# Patient Record
Sex: FEMALE | Employment: FULL TIME
[De-identification: names, ages, dates, MRNs, and addresses within clinical notes are randomized per-mention and may not be internally consistent; named-entity substitution may affect disease eponyms.]

---

## 2023-12-20 ENCOUNTER — TRANSCRIBE ORDERS (OUTPATIENT)
Dept: OTHER | Age: 38
End: 2023-12-20

## 2023-12-20 DIAGNOSIS — H46.9 OPTIC NEURITIS: Primary | ICD-10-CM

## 2023-12-29 NOTE — TELEPHONE ENCOUNTER
Action Malorie Brenda on 12/29/2023 at 2:24 PM    Action Taken Attempted to call Pt to get verbal authorization for  recs and ask if any prior imaging. No answer, left VM. -LANG    Malorie Gutierrez on 1/2/2024 at 10:47 AM Pt returned call and stated that they had an MRI about 18 mo ago and several MRI's & a CT in November. Pt gave verbal authorization to obtain outside records. -LANG     Action 1/2/24 MV 11.42am   Action Taken Images resolved in PACS       RECORDS RECEIVED FROM: External    REASON FOR VISIT: Optic neuritis   Date of Appt: 1/17/24 @ 3:30 pm    NOTES (FOR ALL VISITS) STATUS DETAILS   OFFICE NOTE from referring provider Care Everywhere Dr Martha Henriquez @ [facility]:  11/29/23   DISCHARGE SUMMARY from hospital Care Everywhere [facility]:  11/15/23-11/17/23   DISCHARGE REPORT from the ER Care Everywhere [facility]:  11/7/23   MEDICATION LIST Care Everywhere    IMAGING  (FOR ALL VISITS)     LUMBAR PUNCTURE Care Everywhere [facility];  11/16/23   MRI (HEAD, NECK, SPINE) PACS [facility]:  MRI Thoracic Spine 11/16/23  MRI Cervical Spine 11/16/23  MRI Head 11/15/23  MRA Head 11/15/23  MRI Orbit 11/15/23    [facility]:  MRI Orbit 11/7/23   CT (HEAD, NECK, SPINE) PACS [facility]:  CT Head 11/15/23

## 2024-01-17 ENCOUNTER — PRE VISIT (OUTPATIENT)
Dept: NEUROLOGY | Facility: CLINIC | Age: 39
End: 2024-01-17
Payer: COMMERCIAL

## 2024-01-17 ENCOUNTER — OFFICE VISIT (OUTPATIENT)
Dept: NEUROLOGY | Facility: CLINIC | Age: 39
End: 2024-01-17
Attending: FAMILY MEDICINE
Payer: COMMERCIAL

## 2024-01-17 VITALS
OXYGEN SATURATION: 99 % | DIASTOLIC BLOOD PRESSURE: 78 MMHG | HEIGHT: 68 IN | SYSTOLIC BLOOD PRESSURE: 121 MMHG | BODY MASS INDEX: 21.25 KG/M2 | HEART RATE: 56 BPM | WEIGHT: 140.2 LBS

## 2024-01-17 DIAGNOSIS — H46.9 OPTIC NEURITIS: Primary | ICD-10-CM

## 2024-01-17 PROCEDURE — 99214 OFFICE O/P EST MOD 30 MIN: CPT | Performed by: PSYCHIATRY & NEUROLOGY

## 2024-01-17 PROCEDURE — 99205 OFFICE O/P NEW HI 60 MIN: CPT | Performed by: PSYCHIATRY & NEUROLOGY

## 2024-01-17 RX ORDER — MAGNESIUM CARB/ALUMINUM HYDROX 105-160MG
296 TABLET,CHEWABLE ORAL ONCE
COMMUNITY

## 2024-01-17 RX ORDER — ESCITALOPRAM OXALATE 5 MG/1
5 TABLET ORAL DAILY
COMMUNITY
Start: 2023-03-23 | End: 2024-03-22

## 2024-01-17 ASSESSMENT — PAIN SCALES - GENERAL: PAINLEVEL: NO PAIN (0)

## 2024-01-17 NOTE — PROGRESS NOTES
Date of Service: 1/17/2024    [facility] Neurology   MS Clinic Evaluation    Subjective: 38-year-old woman with a history of celiac disease, strabismus, anxiety/depression, who presents for evaluation of optic neuritis.    In early November she had blurring in vision of the right eye.  Symptoms were associated with some headache and retro-orbital eye pain.  Presented to the ER, but MRI was negative and it was presumed to be migraine.  She then followed up with ophthalmology.  Examination was unremarkable.  Again thought to be migraine.  However vision subsequently declined declined over the course of the next week.  She developed a dense central scotoma.  She presented again to the ER where MRI was remarkable for right optic nerve gadolinium enhancement.  She was given IV methylprednisolone.  She did notice some improvement in her vision while she was on steroids, but feels that she has reached a plateau.  A recent eye exam was remarkable for visual acuity of 20 out of 50 or 20 out of 70.  When she received steroid she did struggle with side effects including difficulty sleeping, tachycardia, and fatigue as well as weakness.  Notes indicate that she did have steroid-induced hyperglycemia.    She denies any other past history of events suggestive of optic neuritis, brainstem syndrome or myelitis.    There is a positive family history remarkable for a sister with rheumatoid arthritis and her dad with CVID.  There is no family history of multiple sclerosis.    Allergies   Allergen Reactions    Aspirin Anaphylaxis, Angioedema, Other (See Comments) and Rash     Advised to not take aspirin    Nsaids Anaphylaxis and Angioedema    Wasp Venom Swelling and Difficulty breathing    Gluten Meal GI Disturbance, Nausea and Vomiting and Other (See Comments)     Celiac disease     Has Celiac disease   Celiac disease       Current Outpatient Medications   Medication    cholecalciferol (VITAMIN D3) 125 mcg (5000 units) capsule     "escitalopram (LEXAPRO) 5 MG tablet    magnesium citrate 1.745 GM/30ML solution     No current facility-administered medications for this visit.        Past medical, surgical, social and family history was personally reviewed. Pertinent details noted above.     Physical Examination:   /78 (BP Location: Left arm, Patient Position: Sitting, Cuff Size: Adult Regular)   Pulse 56   Ht 1.715 m (5' 7.5\")   Wt 63.6 kg (140 lb 3.2 oz)   SpO2 99%   BMI 21.63 kg/m      General: no acute distress  Cranial nerves:   VFFC  PERRL w/+R RAPD ishihara 8/8 ou but diff with right eye  EOM full w/no CHARLIE   Face symmetric  Hearing intact  No dysarthria   Motor:   Tone is normal   Bulk is normal     R L  Deltoid  5 5  Biceps  5 5  Triceps 5 5  Wrist ext 5 5  Finger ext 5 5  Finger abd 5 5    Hip flexion 5 5  Knee flexion 5 5  Knee ext 5 5  Ankle d/f 5 5    Reflexes: 2+ and symmetric throughout, babinski absent bilaterally  Sensory: vibration is normal in the toes, JPS normal in the toes   Romberg is absent  Coordination: no ataxia or dysmetria  Gait: normal base and stride, tandem gait is intact, able to balance on one foot and hop x 5 bilaterally, able to get up from chair with single leg    Tests/Imaging:   CSF 0 ocb    Vitamin D 58  JCV Ab n/d  Mog aqp4 neg    MRI Brain  11/2023 - R ON gd+, left frontal t2 hyperintensity adjascent to cortex but not ovoid, small non specific distal wm lesion in left frontal lobe    MRI Cervical spine   2023 - no lesions     MRI Thoracic spine   2023 - no lesions per personal review    Assessment: 38-year-old woman who had an event of right optic neuritis.  Severity of vision loss was rather dense.  She has had partial improvement in symptoms.    We discussed how most commonly an event of optic neuritis is an initial manifestation of multiple sclerosis, though she does not have any other diagnostic lesions in the brain that would support this diagnosis and CSF was negative for oligoclonal bands. "  Based on 1 study, there is only a 6% chance that over the course of the next 5 years she will go on to develop multiple sclerosis.    Other possible causes of the optic neuritis include MOG autoimmunity, aqua porn for autoimmunity, or sarcoidosis.  In the intensity of the gadolinium enhancement and the persistent visual deficit I recommended that she undergo a repeat MRI of the orbits to assess for persistent enhancement of the right optic nerve.  If this is negative for persistent enhancement, then radiologic surveillance for additional lesions will be planned for 6 months later.  She was in agreement with this plan.    Reviewed the common symptoms of an MS relapse.  She was encouraged to reach out if any of the symptoms occur.    She will continue with her vitamin D supplement.  We discussed exercise and dietary recommendations for autoimmune conditions.    Plan:   -MRI orbits  - Follow-up in 6 months    Note was completed with the assistance of Dragon Fluency software which can often result in accidental word substitutions.     A total of 60 minutes on the date of service were spent in the care of this patient.   Ronda Méndez MD on 1/17/2024 at 5:20 PM

## 2024-01-17 NOTE — PATIENT INSTRUCTIONS
You had an event of optic neuritis     Your MRI does not reveal any other definite MS lesions and your spinal fluid was negative for the inflammatory proteins that we see with MS     Your vision was significantly impacted by this event  This means that I am considering other causes beyond MS (MOG, NMO, sarcoidosis)  You were tested for MOG and NMO and were negative for these conditions     Mri of the orbits to see if there is any residual inflammation   If the inflammation has resolved, plan for another mri in 6 months     Follow up with me in 6 months     Reach out before then with any concerns      Please monitor for the following symptoms:   - decreased vision from one eye, often associated with pain behind the eye  - double vision, often associated with walking difficulty  - numbness/tingling in an arm or leg that progresses over a few days  - weakness in an arm or leg    Symptoms of an MS relapse often progress over several hours or days.  People commonly experience intermittent numbness/tingling.  Only symptoms lasting more than 24 hours are concerning for a new relapse.

## 2024-01-17 NOTE — NURSING NOTE
Chief Complaint   Patient presents with    MS    New Patient     Establishing MS Care      Vitals were taken and medications were reconciled.   Adrian House, EMT  3:40 PM

## 2024-01-31 ENCOUNTER — HOSPITAL ENCOUNTER (OUTPATIENT)
Dept: MRI IMAGING | Facility: HOSPITAL | Age: 39
Discharge: HOME | End: 2024-01-31
Attending: PSYCHIATRY & NEUROLOGY | Admitting: PSYCHIATRY & NEUROLOGY
Payer: COMMERCIAL

## 2024-01-31 DIAGNOSIS — H46.9 OPTIC NEURITIS: ICD-10-CM

## 2024-01-31 PROCEDURE — 255N000002 HC RX 255 OP 636: Performed by: PSYCHIATRY & NEUROLOGY

## 2024-01-31 PROCEDURE — 70553 MRI BRAIN STEM W/O & W/DYE: CPT

## 2024-01-31 PROCEDURE — A9585 GADOBUTROL INJECTION: HCPCS | Performed by: PSYCHIATRY & NEUROLOGY

## 2024-01-31 RX ORDER — GADOBUTROL 604.72 MG/ML
0.1 INJECTION INTRAVENOUS ONCE
Status: COMPLETED | OUTPATIENT
Start: 2024-01-31 | End: 2024-01-31

## 2024-01-31 RX ADMIN — GADOBUTROL 6 ML: 604.72 INJECTION INTRAVENOUS at 10:08

## 2024-02-10 ENCOUNTER — HEALTH MAINTENANCE LETTER (OUTPATIENT)
Age: 39
End: 2024-02-10

## 2024-03-28 ENCOUNTER — OFFICE VISIT (OUTPATIENT)
Dept: FAMILY MEDICINE | Facility: CLINIC | Age: 39
End: 2024-03-28
Payer: COMMERCIAL

## 2024-03-28 VITALS
OXYGEN SATURATION: 98 % | RESPIRATION RATE: 16 BRPM | WEIGHT: 135 LBS | BODY MASS INDEX: 20.83 KG/M2 | TEMPERATURE: 97.7 F | HEART RATE: 60 BPM | DIASTOLIC BLOOD PRESSURE: 78 MMHG | SYSTOLIC BLOOD PRESSURE: 118 MMHG

## 2024-03-28 DIAGNOSIS — H10.32 ACUTE CONJUNCTIVITIS OF LEFT EYE, UNSPECIFIED ACUTE CONJUNCTIVITIS TYPE: Primary | ICD-10-CM

## 2024-03-28 PROCEDURE — 99203 OFFICE O/P NEW LOW 30 MIN: CPT | Performed by: FAMILY MEDICINE

## 2024-03-28 RX ORDER — OFLOXACIN 3 MG/ML
SOLUTION/ DROPS OPHTHALMIC
Qty: 5 ML | Refills: 0 | Status: SHIPPED | OUTPATIENT
Start: 2024-03-28 | End: 2024-04-04

## 2024-03-28 NOTE — PROGRESS NOTES
Kari was seen today for eye problem and conjunctivitis.    Diagnoses and all orders for this visit:    Acute conjunctivitis of left eye, unspecified acute conjunctivitis type  -     ofloxacin (OCUFLOX) 0.3 % ophthalmic solution; 1-2 drops in affected eye(s) every 2-4 hrs while awake for 2 days and then 4 times a day for 5 days.    She has not discharge from the eye at this point so I recommend she remove her contact from left eye and use saline drops PRN for relief.  She could have had some irritant in the eye causing symptoms.  If she gets worsening symptoms or discharge, she can start the antibiotic eye drops.  Advised to throw away her current contacts and keep out for duration of treatment course.      Subjective   Kari Brown is a 38 year old is presenting for the following health issues:  Left red eye      HPI : Kari Brown noticed her left eye was red yesterday.  Has some swelling over eye and painful to move eye laterally.  No discharge from eye.  No change in vision.  She does wear contacts that she changes every 2 weeks. Still has her contact in.  Tried using some eye drops a few times but no improvement.    No trauma or injury to eye.  No new products to clean contacts or new make-up.    No rhinorrhea/ congestion or URI symptoms.  Works in ED as RN.  Last fall had optic neuritis.    Denies any risk pregnancy.      Review of Systems:     There is no problem list on file for this patient.             Objective:  /78   Pulse 60   Temp 97.7  F (36.5  C)   Resp 16   Wt 61.2 kg (135 lb)   LMP 03/11/2024   SpO2 98%   BMI 20.83 kg/m   No acute distress.  HEENT: Her tympanic membranes are grey with normal landmarks.  Eyes: PERRL.  Conjunctiva mildly injected left eye laterally.  No discharge.  Left upper lid everted with no foreign body.  Anterior chambers clear.              Rhiannon Sadler MD

## 2024-03-29 ENCOUNTER — HOSPITAL ENCOUNTER (OUTPATIENT)
Dept: MRI IMAGING | Facility: CLINIC | Age: 39
Discharge: HOME | End: 2024-03-29
Attending: PSYCHIATRY & NEUROLOGY | Admitting: PSYCHIATRY & NEUROLOGY
Payer: COMMERCIAL

## 2024-03-29 DIAGNOSIS — G37.9 DEMYELINATING DISEASE (H): ICD-10-CM

## 2024-03-29 DIAGNOSIS — H46.9 OPTIC NEURITIS: ICD-10-CM

## 2024-03-29 PROCEDURE — 255N000002 HC RX 255 OP 636: Performed by: PSYCHIATRY & NEUROLOGY

## 2024-03-29 PROCEDURE — 70553 MRI BRAIN STEM W/O & W/DYE: CPT

## 2024-03-29 PROCEDURE — A9585 GADOBUTROL INJECTION: HCPCS | Performed by: PSYCHIATRY & NEUROLOGY

## 2024-03-29 RX ORDER — GADOBUTROL 604.72 MG/ML
6 INJECTION INTRAVENOUS ONCE
Status: COMPLETED | OUTPATIENT
Start: 2024-03-29 | End: 2024-03-29

## 2024-03-29 RX ADMIN — GADOBUTROL 6 ML: 604.72 INJECTION INTRAVENOUS at 07:11

## 2024-08-22 ENCOUNTER — TELEPHONE (OUTPATIENT)
Dept: NEUROLOGY | Facility: CLINIC | Age: 39
End: 2024-08-22
Payer: COMMERCIAL

## 2024-08-22 NOTE — TELEPHONE ENCOUNTER
Patient left voicemail regarding rescheduling 9/23 Appointment with Dr. Méndez. I called Kari back and ended up rescheduling to 12/5 @ 10:30am. She also was wondering if she needed to get a MRI before or after the appointment on 12/5. She was told she had to get one every 6 months, leaving her next MRI in September

## 2024-08-22 NOTE — TELEPHONE ENCOUNTER
Called and spoke to the pt.     Informed the pt that she can get the MRI done prior to her appt with Dr. Méndez on 12/5/24.     I gave the pt the scheduling number to call to schedule for her MRI appt, [phone].      Pt have no other questions.      VALENTIN Pettit on 8/22/2024 at 3:01 PM

## 2025-01-14 ENCOUNTER — MYC MEDICAL ADVICE (OUTPATIENT)
Dept: NEUROLOGY | Facility: CLINIC | Age: 40
End: 2025-01-14

## 2025-01-14 ENCOUNTER — HOSPITAL ENCOUNTER (OUTPATIENT)
Dept: MRI IMAGING | Facility: CLINIC | Age: 40
Discharge: HOME | End: 2025-01-14
Attending: PSYCHIATRY & NEUROLOGY
Payer: COMMERCIAL

## 2025-01-14 DIAGNOSIS — G37.9 DEMYELINATING DISEASE (H): ICD-10-CM

## 2025-01-14 DIAGNOSIS — G35 MULTIPLE SCLEROSIS (H): Primary | ICD-10-CM

## 2025-01-14 DIAGNOSIS — H46.9 OPTIC NEURITIS: ICD-10-CM

## 2025-01-14 PROCEDURE — 255N000002 HC RX 255 OP 636: Performed by: PSYCHIATRY & NEUROLOGY

## 2025-01-14 PROCEDURE — A9585 GADOBUTROL INJECTION: HCPCS | Performed by: PSYCHIATRY & NEUROLOGY

## 2025-01-14 PROCEDURE — 70553 MRI BRAIN STEM W/O & W/DYE: CPT

## 2025-01-14 RX ORDER — GADOBUTROL 604.72 MG/ML
6 INJECTION INTRAVENOUS ONCE
Status: COMPLETED | OUTPATIENT
Start: 2025-01-14 | End: 2025-01-14

## 2025-01-14 RX ADMIN — GADOBUTROL 6 ML: 604.72 INJECTION INTRAVENOUS at 09:27

## 2025-01-15 PROBLEM — G35 MULTIPLE SCLEROSIS (H): Status: ACTIVE | Noted: 2025-01-15

## 2025-01-15 RX ORDER — EPINEPHRINE 1 MG/ML
0.3 INJECTION, SOLUTION, CONCENTRATE INTRAVENOUS EVERY 5 MIN PRN
OUTPATIENT
Start: 2025-01-15

## 2025-01-15 RX ORDER — HEPARIN SODIUM,PORCINE 10 UNIT/ML
5-20 VIAL (ML) INTRAVENOUS DAILY PRN
OUTPATIENT
Start: 2025-01-15

## 2025-01-15 RX ORDER — HEPARIN SODIUM (PORCINE) LOCK FLUSH IV SOLN 100 UNIT/ML 100 UNIT/ML
5 SOLUTION INTRAVENOUS
OUTPATIENT
Start: 2025-01-15

## 2025-01-15 RX ORDER — DIPHENHYDRAMINE HYDROCHLORIDE 50 MG/ML
50 INJECTION INTRAMUSCULAR; INTRAVENOUS
Start: 2025-01-15

## 2025-01-15 RX ORDER — METHYLPREDNISOLONE SODIUM SUCCINATE 40 MG/ML
40 INJECTION INTRAMUSCULAR; INTRAVENOUS
Start: 2025-01-15

## 2025-01-15 RX ORDER — DIPHENHYDRAMINE HYDROCHLORIDE 50 MG/ML
25 INJECTION INTRAMUSCULAR; INTRAVENOUS
Start: 2025-01-15

## 2025-01-15 RX ORDER — ALBUTEROL SULFATE 90 UG/1
1-2 INHALANT RESPIRATORY (INHALATION)
Start: 2025-01-15

## 2025-01-15 RX ORDER — MEPERIDINE HYDROCHLORIDE 25 MG/ML
25 INJECTION INTRAMUSCULAR; INTRAVENOUS; SUBCUTANEOUS
OUTPATIENT
Start: 2025-01-15

## 2025-01-15 RX ORDER — ALBUTEROL SULFATE 0.83 MG/ML
2.5 SOLUTION RESPIRATORY (INHALATION)
OUTPATIENT
Start: 2025-01-15

## 2025-01-22 ENCOUNTER — INFUSION THERAPY VISIT (OUTPATIENT)
Dept: INFUSION THERAPY | Facility: CLINIC | Age: 40
End: 2025-01-22
Attending: PSYCHIATRY & NEUROLOGY
Payer: COMMERCIAL

## 2025-01-22 VITALS
DIASTOLIC BLOOD PRESSURE: 63 MMHG | OXYGEN SATURATION: 98 % | HEART RATE: 58 BPM | SYSTOLIC BLOOD PRESSURE: 99 MMHG | TEMPERATURE: 97.7 F

## 2025-01-22 DIAGNOSIS — G35 MULTIPLE SCLEROSIS (H): Primary | ICD-10-CM

## 2025-01-22 PROCEDURE — 96365 THER/PROPH/DIAG IV INF INIT: CPT

## 2025-01-22 PROCEDURE — 258N000003 HC RX IP 258 OP 636: Performed by: PSYCHIATRY & NEUROLOGY

## 2025-01-22 PROCEDURE — 250N000011 HC RX IP 250 OP 636: Performed by: PSYCHIATRY & NEUROLOGY

## 2025-01-22 RX ORDER — MEPERIDINE HYDROCHLORIDE 25 MG/ML
25 INJECTION INTRAMUSCULAR; INTRAVENOUS; SUBCUTANEOUS
Status: CANCELLED | OUTPATIENT
Start: 2025-01-23

## 2025-01-22 RX ORDER — DIPHENHYDRAMINE HYDROCHLORIDE 50 MG/ML
25 INJECTION INTRAMUSCULAR; INTRAVENOUS
Status: CANCELLED
Start: 2025-01-23

## 2025-01-22 RX ORDER — DIPHENHYDRAMINE HYDROCHLORIDE 50 MG/ML
50 INJECTION INTRAMUSCULAR; INTRAVENOUS
Status: CANCELLED
Start: 2025-01-23

## 2025-01-22 RX ORDER — HEPARIN SODIUM (PORCINE) LOCK FLUSH IV SOLN 100 UNIT/ML 100 UNIT/ML
5 SOLUTION INTRAVENOUS
Status: CANCELLED | OUTPATIENT
Start: 2025-01-23

## 2025-01-22 RX ORDER — ALBUTEROL SULFATE 0.83 MG/ML
2.5 SOLUTION RESPIRATORY (INHALATION)
Status: CANCELLED | OUTPATIENT
Start: 2025-01-23

## 2025-01-22 RX ORDER — METHYLPREDNISOLONE SODIUM SUCCINATE 40 MG/ML
40 INJECTION INTRAMUSCULAR; INTRAVENOUS
Status: CANCELLED
Start: 2025-01-23

## 2025-01-22 RX ORDER — ALBUTEROL SULFATE 90 UG/1
1-2 INHALANT RESPIRATORY (INHALATION)
Status: CANCELLED
Start: 2025-01-23

## 2025-01-22 RX ORDER — EPINEPHRINE 1 MG/ML
0.3 INJECTION, SOLUTION, CONCENTRATE INTRAVENOUS EVERY 5 MIN PRN
Status: CANCELLED | OUTPATIENT
Start: 2025-01-23

## 2025-01-22 RX ORDER — HEPARIN SODIUM,PORCINE 10 UNIT/ML
5-20 VIAL (ML) INTRAVENOUS DAILY PRN
Status: CANCELLED | OUTPATIENT
Start: 2025-01-23

## 2025-01-22 RX ADMIN — SODIUM CHLORIDE 1000 MG: 9 INJECTION, SOLUTION INTRAVENOUS at 13:47

## 2025-01-22 NOTE — PROGRESS NOTES
Infusion Nursing Note:  Kari Brown presents today for methylprednisolone.    Patient seen by provider today: No   present during visit today: Not Applicable.    Note: Pt here for methylprednisolone infusion. Has received in the past.       Intravenous Access:  Peripheral IV placed.    Treatment Conditions:  Not Applicable.      Post Infusion Assessment:  Patient tolerated infusion without incident.  Site patent and intact, free from redness, edema or discomfort.  No evidence of extravasations.  Access discontinued per protocol.       Discharge Plan:   Discharge instructions reviewed with: Patient.  Patient and/or family verbalized understanding of discharge instructions and all questions answered.  Patient discharged in stable condition accompanied by: self.  Departure Mode: Ambulatory.      Keli Uriarte RN

## 2025-01-23 ENCOUNTER — INFUSION THERAPY VISIT (OUTPATIENT)
Dept: INFUSION THERAPY | Facility: CLINIC | Age: 40
End: 2025-01-23
Attending: PSYCHIATRY & NEUROLOGY
Payer: COMMERCIAL

## 2025-01-23 VITALS
HEART RATE: 56 BPM | RESPIRATION RATE: 16 BRPM | TEMPERATURE: 97.8 F | DIASTOLIC BLOOD PRESSURE: 60 MMHG | OXYGEN SATURATION: 99 % | SYSTOLIC BLOOD PRESSURE: 100 MMHG

## 2025-01-23 DIAGNOSIS — G35 MULTIPLE SCLEROSIS (H): Primary | ICD-10-CM

## 2025-01-23 PROCEDURE — 258N000003 HC RX IP 258 OP 636: Performed by: PSYCHIATRY & NEUROLOGY

## 2025-01-23 PROCEDURE — 250N000011 HC RX IP 250 OP 636: Performed by: PSYCHIATRY & NEUROLOGY

## 2025-01-23 RX ORDER — HEPARIN SODIUM,PORCINE 10 UNIT/ML
5-20 VIAL (ML) INTRAVENOUS DAILY PRN
OUTPATIENT
Start: 2025-01-24

## 2025-01-23 RX ORDER — ALBUTEROL SULFATE 90 UG/1
1-2 INHALANT RESPIRATORY (INHALATION)
Start: 2025-01-24

## 2025-01-23 RX ORDER — DIPHENHYDRAMINE HYDROCHLORIDE 50 MG/ML
25 INJECTION INTRAMUSCULAR; INTRAVENOUS
Start: 2025-01-24

## 2025-01-23 RX ORDER — DIPHENHYDRAMINE HYDROCHLORIDE 50 MG/ML
50 INJECTION INTRAMUSCULAR; INTRAVENOUS
Start: 2025-01-24

## 2025-01-23 RX ORDER — ALBUTEROL SULFATE 0.83 MG/ML
2.5 SOLUTION RESPIRATORY (INHALATION)
OUTPATIENT
Start: 2025-01-24

## 2025-01-23 RX ORDER — METHYLPREDNISOLONE SODIUM SUCCINATE 40 MG/ML
40 INJECTION INTRAMUSCULAR; INTRAVENOUS
Start: 2025-01-24

## 2025-01-23 RX ORDER — HEPARIN SODIUM (PORCINE) LOCK FLUSH IV SOLN 100 UNIT/ML 100 UNIT/ML
5 SOLUTION INTRAVENOUS
OUTPATIENT
Start: 2025-01-24

## 2025-01-23 RX ORDER — EPINEPHRINE 1 MG/ML
0.3 INJECTION, SOLUTION, CONCENTRATE INTRAVENOUS EVERY 5 MIN PRN
OUTPATIENT
Start: 2025-01-24

## 2025-01-23 RX ORDER — MEPERIDINE HYDROCHLORIDE 25 MG/ML
25 INJECTION INTRAMUSCULAR; INTRAVENOUS; SUBCUTANEOUS
OUTPATIENT
Start: 2025-01-24

## 2025-01-23 RX ADMIN — SODIUM CHLORIDE 1000 MG: 9 INJECTION, SOLUTION INTRAVENOUS at 09:41

## 2025-01-23 ASSESSMENT — PAIN SCALES - GENERAL: PAINLEVEL_OUTOF10: NO PAIN (0)

## 2025-01-23 NOTE — PROGRESS NOTES
"Infusion Nursing Note:  Kari Brown presents today for 2/3 solumedrol.    Patient seen by provider today: No   present during visit today: Not Applicable.    Note: Patient states her vision is slightly better after her first dose of solumedrol.  She states she does not feel great being on the solumedrol, has had some dizziness and shortness of breath, and just feels \"off\".      Intravenous Access:  Peripheral IV placed.    Treatment Conditions:  Not Applicable.      Post Infusion Assessment:  Patient tolerated infusion without incident.  Blood return noted pre and post infusion.  Site patent and intact, free from redness, edema or discomfort.  No evidence of extravasations.  Access discontinued per protocol.       Discharge Plan:   Patient discharged in stable condition accompanied by: SO.  Departure Mode: Ambulatory.  Will return tomorrow for final dose    Deena Butler RN    "

## 2025-02-06 ENCOUNTER — OFFICE VISIT (OUTPATIENT)
Dept: NEUROLOGY | Facility: CLINIC | Age: 40
End: 2025-02-06
Payer: COMMERCIAL

## 2025-02-06 ENCOUNTER — LAB (OUTPATIENT)
Dept: LAB | Facility: CLINIC | Age: 40
End: 2025-02-06
Payer: COMMERCIAL

## 2025-02-06 VITALS — SYSTOLIC BLOOD PRESSURE: 116 MMHG | DIASTOLIC BLOOD PRESSURE: 67 MMHG | HEART RATE: 71 BPM

## 2025-02-06 DIAGNOSIS — G35 MULTIPLE SCLEROSIS (H): ICD-10-CM

## 2025-02-06 DIAGNOSIS — G35 MULTIPLE SCLEROSIS (H): Primary | ICD-10-CM

## 2025-02-06 DIAGNOSIS — R76.8 HIGH TOTAL SERUM IGM: ICD-10-CM

## 2025-02-06 LAB
BASOPHILS # BLD AUTO: 0 10E3/UL (ref 0–0.2)
BASOPHILS NFR BLD AUTO: 1 %
EOSINOPHIL # BLD AUTO: 0.1 10E3/UL (ref 0–0.7)
EOSINOPHIL NFR BLD AUTO: 2 %
ERYTHROCYTE [DISTWIDTH] IN BLOOD BY AUTOMATED COUNT: 14.4 % (ref 10–15)
FOLATE SERPL-MCNC: 6.6 NG/ML (ref 4.6–34.8)
HBV CORE AB SERPL QL IA: NONREACTIVE
HBV SURFACE AB SERPL IA-ACNC: >1000 M[IU]/ML
HBV SURFACE AB SERPL IA-ACNC: REACTIVE M[IU]/ML
HBV SURFACE AG SERPL QL IA: NONREACTIVE
HCT VFR BLD AUTO: 37.9 % (ref 35–47)
HGB BLD-MCNC: 12.9 G/DL (ref 11.7–15.7)
IMM GRANULOCYTES # BLD: 0 10E3/UL
IMM GRANULOCYTES NFR BLD: 0 %
LYMPHOCYTES # BLD AUTO: 1.7 10E3/UL (ref 0.8–5.3)
LYMPHOCYTES NFR BLD AUTO: 36 %
MCH RBC QN AUTO: 28.9 PG (ref 26.5–33)
MCHC RBC AUTO-ENTMCNC: 34 G/DL (ref 31.5–36.5)
MCV RBC AUTO: 85 FL (ref 78–100)
MONOCYTES # BLD AUTO: 0.4 10E3/UL (ref 0–1.3)
MONOCYTES NFR BLD AUTO: 8 %
NEUTROPHILS # BLD AUTO: 2.5 10E3/UL (ref 1.6–8.3)
NEUTROPHILS NFR BLD AUTO: 53 %
NRBC # BLD AUTO: 0 10E3/UL
NRBC BLD AUTO-RTO: 0 /100
PLATELET # BLD AUTO: 179 10E3/UL (ref 150–450)
RBC # BLD AUTO: 4.47 10E6/UL (ref 3.8–5.2)
VIT B12 SERPL-MCNC: 519 PG/ML (ref 232–1245)
WBC # BLD AUTO: 4.7 10E3/UL (ref 4–11)

## 2025-02-06 PROCEDURE — 36415 COLL VENOUS BLD VENIPUNCTURE: CPT

## 2025-02-06 PROCEDURE — 86706 HEP B SURFACE ANTIBODY: CPT

## 2025-02-06 PROCEDURE — 86704 HEP B CORE ANTIBODY TOTAL: CPT

## 2025-02-06 PROCEDURE — 86787 VARICELLA-ZOSTER ANTIBODY: CPT

## 2025-02-06 PROCEDURE — 84165 PROTEIN E-PHORESIS SERUM: CPT | Mod: TC | Performed by: PATHOLOGY

## 2025-02-06 PROCEDURE — 82746 ASSAY OF FOLIC ACID SERUM: CPT

## 2025-02-06 PROCEDURE — 85004 AUTOMATED DIFF WBC COUNT: CPT

## 2025-02-06 PROCEDURE — 83921 ORGANIC ACID SINGLE QUANT: CPT

## 2025-02-06 PROCEDURE — 82607 VITAMIN B-12: CPT

## 2025-02-06 PROCEDURE — 82784 ASSAY IGA/IGD/IGG/IGM EACH: CPT

## 2025-02-06 PROCEDURE — 84155 ASSAY OF PROTEIN SERUM: CPT

## 2025-02-06 PROCEDURE — 87340 HEPATITIS B SURFACE AG IA: CPT

## 2025-02-06 NOTE — PATIENT INSTRUCTIONS
Blood work today     The recurrent event of optic neuritis is most suggestive of multiple sclerosis     Update MRI of the spinal cord     We discussed treatment - tysabri if you are molly virus negative, or ocrevus/kesimpta/briumvi    Follow up in about 4 months

## 2025-02-06 NOTE — PROGRESS NOTES
Date of Service: 2/6/2025    [facility] Neurology   MS Clinic Evaluation    Subjective: 39-year-old woman with a history of celiac disease, strabismus, anxiety/depression, who presents in follow-up for optic neuritis.    Recall that she has a history of right optic neuritis in 2023.  This past December she had a recurrent event.  He started to notice some significant blurriness in vision from the right eye again.  She experienced a decline in color vision and had difficulty reading.  Symptoms persisted for a number of weeks.  She reached out.  MRI was performed and remarkable for gadolinium enhancement of the optic nerve.  It was faints, but given the functional changes I recommended a course of IV steroids.  She tolerated the steroids, but did experience side effects including significant of fatigue and decline in activity tolerance.  Subsequent to the steroid she did notice significant improvement in her vision.    She is also experienced some numbness in the right inner thigh.  This has been ongoing for the past couple months.  She has not noticed any changes in bladder, bowel control or balance.    She currently works in the [employer].    Allergies   Allergen Reactions    Aspirin Anaphylaxis, Angioedema, Other (See Comments) and Rash     Advised to not take aspirin    Nsaids Anaphylaxis and Angioedema    Wasp Venom Swelling and Difficulty breathing    Gluten Meal GI Disturbance, Nausea and Vomiting and Other (See Comments)     Celiac disease     Has Celiac disease   Celiac disease       Current Outpatient Medications   Medication Sig Dispense Refill    escitalopram (LEXAPRO) 5 MG tablet Take 5 mg by mouth daily      cholecalciferol (VITAMIN D3) 125 mcg (5000 units) capsule Take by mouth daily      magnesium citrate 1.745 GM/30ML solution Take 296 mLs by mouth once       No current facility-administered medications for this visit.        Past medical, surgical, social and family history was personally reviewed.  Pertinent details noted above.     Physical Examination:   /67 (BP Location: Right arm, Patient Position: Sitting, Cuff Size: Adult Regular)   Pulse 71     General: no acute distress  Cranial nerves:   VFFC  PERRL w/ no RAPD  EOM full w/no CHARLIE   Face symmetric  Hearing intact  No dysarthria   Motor:   Tone is normal   Bulk is normal     R L  Deltoid  5 5  Biceps  5 5  Triceps 5 5  Wrist ext 5 5  Finger ext 5 5  Finger abd 5 5    Hip flexion 5- 5  Knee flexion 5- 5  Knee ext 5 5  Ankle d/f 5- 5    Reflexes: brisk in LE, babinski absent bilaterally  Sensory: vibration is moderately reduced in the toes, JPS mildly reduced in the right toe  Romberg is present  Coordination: no ataxia or dysmetria  Gait: normal base and stride, tandem gait is intact, able to balance on one foot and hop x 5 bilaterally, able to get up from chair with single leg but a bit more imbalanced with right leg     Tests/Imaging:   CSF 0 ocb    Vitamin D 58  JCV Ab n/d  Mog aqp4 neg    MRI Brain  11/2023 - R ON gd+, left frontal t2 hyperintensity adjascent to cortex but not ovoid, small non specific distal wm lesion in left frontal lobe  1/2025-no new lesions, R ON faintly gd+    MRI Cervical spine   2023 - no lesions     MRI Thoracic spine   2023 - no lesions per personal review    Assessment: 39-year-old woman with 2 events of optic neuritis.  Clinical examination today also reveals a decline in sensation in the lower extremities in association with Romberg.  These findings are concerning for an event of myelitis as well.  I recommended that she undergo an MRI of the cervical and thoracic spine.  It would also be reasonable for her to start treatment to prevent additional relapses.  It is my impression that most likely this is relapsing remitting multiple sclerosis.    I have therefore recommended that she consider treatment with Tysabri or an anti-B cell therapy.  Risks and benefits of each treatment were discussed in detail.  Baseline  blood work will be done today to determine which treatments are options for her.    Given her history of celiac disease I recommended including some vitamin levels.    Plan:   -MRI cervical and thoracic spine  - Baseline blood work  - Anticipate start of disease modifying therapy  - Follow-up in 4 months    Note was completed with the assistance of Dragon Fluency software which can often result in accidental word substitutions.     A total of 40 minutes on the date of service were spent in the care of this patient.   Ronda Méndez MD on 2/6/2025 at 8:35 AM

## 2025-02-06 NOTE — NURSING NOTE
Chief Complaint   Patient presents with    Follow Up       VALENTIN Pettit on 2/6/2025 at 8:29 AM

## 2025-02-06 NOTE — LETTER
2/6/2025      Kari Brown  [address]  [place] [address]      Dear Colleague,    Thank you for referring your patient, Kari Brown, to the [facility]. Please see a copy of my visit note below.    Date of Service: 2/6/2025    [facility] Neurology   MS Clinic Evaluation    Subjective: 39-year-old woman with a history of celiac disease, strabismus, anxiety/depression, who presents in follow-up for optic neuritis.    Recall that she has a history of right optic neuritis in 2023.  This past December she had a recurrent event.  He started to notice some significant blurriness in vision from the right eye again.  She experienced a decline in color vision and had difficulty reading.  Symptoms persisted for a number of weeks.  She reached out.  MRI was performed and remarkable for gadolinium enhancement of the optic nerve.  It was faints, but given the functional changes I recommended a course of IV steroids.  She tolerated the steroids, but did experience side effects including significant of fatigue and decline in activity tolerance.  Subsequent to the steroid she did notice significant improvement in her vision.    She is also experienced some numbness in the right inner thigh.  This has been ongoing for the past couple months.  She has not noticed any changes in bladder, bowel control or balance.    She currently works in the [employer].    Allergies   Allergen Reactions     Aspirin Anaphylaxis, Angioedema, Other (See Comments) and Rash     Advised to not take aspirin     Nsaids Anaphylaxis and Angioedema     Wasp Venom Swelling and Difficulty breathing     Gluten Meal GI Disturbance, Nausea and Vomiting and Other (See Comments)     Celiac disease     Has Celiac disease   Celiac disease       Current Outpatient Medications   Medication Sig Dispense Refill     escitalopram (LEXAPRO) 5 MG tablet Take 5 mg by mouth daily       cholecalciferol (VITAMIN D3) 125 mcg (5000  units) capsule Take by mouth daily       magnesium citrate 1.745 GM/30ML solution Take 296 mLs by mouth once       No current facility-administered medications for this visit.        Past medical, surgical, social and family history was personally reviewed. Pertinent details noted above.     Physical Examination:   /67 (BP Location: Right arm, Patient Position: Sitting, Cuff Size: Adult Regular)   Pulse 71     General: no acute distress  Cranial nerves:   VFFC  PERRL w/ no RAPD  EOM full w/no CHARLIE   Face symmetric  Hearing intact  No dysarthria   Motor:   Tone is normal   Bulk is normal     R L  Deltoid  5 5  Biceps  5 5  Triceps 5 5  Wrist ext 5 5  Finger ext 5 5  Finger abd 5 5    Hip flexion 5- 5  Knee flexion 5- 5  Knee ext 5 5  Ankle d/f 5- 5    Reflexes: brisk in LE, babinski absent bilaterally  Sensory: vibration is moderately reduced in the toes, JPS mildly reduced in the right toe  Romberg is present  Coordination: no ataxia or dysmetria  Gait: normal base and stride, tandem gait is intact, able to balance on one foot and hop x 5 bilaterally, able to get up from chair with single leg but a bit more imbalanced with right leg     Tests/Imaging:   CSF 0 ocb    Vitamin D 58  JCV Ab n/d  Mog aqp4 neg    MRI Brain  11/2023 - R ON gd+, left frontal t2 hyperintensity adjascent to cortex but not ovoid, small non specific distal wm lesion in left frontal lobe  1/2025-no new lesions, R ON faintly gd+    MRI Cervical spine   2023 - no lesions     MRI Thoracic spine   2023 - no lesions per personal review    Assessment: 39-year-old woman with 2 events of optic neuritis.  Clinical examination today also reveals a decline in sensation in the lower extremities in association with Romberg.  These findings are concerning for an event of myelitis as well.  I recommended that she undergo an MRI of the cervical and thoracic spine.  It would also be reasonable for her to start treatment to prevent additional relapses.  It  is my impression that most likely this is relapsing remitting multiple sclerosis.    I have therefore recommended that she consider treatment with Tysabri or an anti-B cell therapy.  Risks and benefits of each treatment were discussed in detail.  Baseline blood work will be done today to determine which treatments are options for her.    Given her history of celiac disease I recommended including some vitamin levels.    Plan:   -MRI cervical and thoracic spine  - Baseline blood work  - Anticipate start of disease modifying therapy  - Follow-up in 4 months    Note was completed with the assistance of Dragon Fluency software which can often result in accidental word substitutions.     A total of 40 minutes on the date of service were spent in the care of this patient.   Ronda Méndez MD on 2/6/2025 at 8:35 AM          Again, thank you for allowing me to participate in the care of your patient.        Sincerely,        Ronda Méndez MD    Electronically signed

## 2025-02-07 LAB
IGA SERPL-MCNC: 112 MG/DL (ref 84–499)
IGG SERPL-MCNC: 544 MG/DL (ref 610–1616)
IGM SERPL-MCNC: 327 MG/DL (ref 35–242)
VZV IGG SER QL IA: 6.43 S/CO
VZV IGG SER QL IA: POSITIVE

## 2025-02-08 ENCOUNTER — TELEPHONE (OUTPATIENT)
Dept: NEUROLOGY | Facility: CLINIC | Age: 40
End: 2025-02-08
Payer: COMMERCIAL

## 2025-02-08 DIAGNOSIS — R76.8 HIGH TOTAL SERUM IGM: Primary | ICD-10-CM

## 2025-02-08 LAB — TOTAL PROTEIN SERUM FOR ELP: 6.3 G/DL (ref 6.4–8.3)

## 2025-02-10 LAB
ALBUMIN SERPL ELPH-MCNC: 4.1 G/DL (ref 3.7–5.1)
ALPHA1 GLOB SERPL ELPH-MCNC: 0.2 G/DL (ref 0.2–0.4)
ALPHA2 GLOB SERPL ELPH-MCNC: 0.7 G/DL (ref 0.5–0.9)
B-GLOBULIN SERPL ELPH-MCNC: 0.6 G/DL (ref 0.6–1)
GAMMA GLOB SERPL ELPH-MCNC: 0.7 G/DL (ref 0.7–1.6)
M PROTEIN SERPL ELPH-MCNC: 0 G/DL
PROT PATTERN SERPL ELPH-IMP: NORMAL

## 2025-02-10 PROCEDURE — 84165 PROTEIN E-PHORESIS SERUM: CPT | Mod: 26 | Performed by: PATHOLOGY

## 2025-02-11 LAB — SCANNED LAB RESULT: ABNORMAL

## 2025-02-12 LAB — METHYLMALONATE SERPL-SCNC: 0.2 UMOL/L (ref 0–0.4)

## 2025-02-19 ENCOUNTER — HOSPITAL ENCOUNTER (OUTPATIENT)
Dept: MRI IMAGING | Facility: HOSPITAL | Age: 40
Discharge: HOME | End: 2025-02-19
Attending: PSYCHIATRY & NEUROLOGY
Payer: COMMERCIAL

## 2025-02-19 DIAGNOSIS — G35 MULTIPLE SCLEROSIS (H): ICD-10-CM

## 2025-02-19 PROCEDURE — A9585 GADOBUTROL INJECTION: HCPCS | Performed by: PSYCHIATRY & NEUROLOGY

## 2025-02-19 PROCEDURE — 72157 MRI CHEST SPINE W/O & W/DYE: CPT

## 2025-02-19 PROCEDURE — 255N000002 HC RX 255 OP 636: Performed by: PSYCHIATRY & NEUROLOGY

## 2025-02-19 PROCEDURE — 72156 MRI NECK SPINE W/O & W/DYE: CPT

## 2025-02-19 RX ORDER — GADOBUTROL 604.72 MG/ML
0.1 INJECTION INTRAVENOUS ONCE
Status: COMPLETED | OUTPATIENT
Start: 2025-02-19 | End: 2025-02-19

## 2025-02-19 RX ADMIN — GADOBUTROL 6 ML: 604.72 INJECTION INTRAVENOUS at 07:19

## 2025-02-20 ENCOUNTER — TELEPHONE (OUTPATIENT)
Dept: NEUROLOGY | Facility: CLINIC | Age: 40
End: 2025-02-20
Payer: COMMERCIAL

## 2025-02-20 DIAGNOSIS — G35 MULTIPLE SCLEROSIS (H): Primary | ICD-10-CM

## 2025-02-20 NOTE — TELEPHONE ENCOUNTER
Patient completed cervical/thoracis MRIs 2/19.     LOV 2/6 Plan:   -MRI cervical and thoracic spine  - Baseline blood work  - Anticipate start of disease modifying therapy  - Follow-up in 4 months    Denise PAEZ RN, BSN  [facility]

## 2025-02-20 NOTE — TELEPHONE ENCOUNTER
[facility] Call Center    Phone Message    May a detailed message be left on voicemail: yes     Reason for Call: Patient has questions of  what the plan is for the most MRI results.  Patient requests a call back to discuss next steps     Action Taken: [facility] Neurology    Travel Screening: Not Applicable     Date of Service:

## 2025-02-21 NOTE — TELEPHONE ENCOUNTER
[facility] Call Center    Phone Message    May a detailed message be left on voicemail: no     Reason for Call: Other: Pt returning call.     If unable to call back today (Friday) okay to try calling pt back some time on Tuesday.     Action Taken: Other: Neurology    Travel Screening: Not Applicable

## 2025-02-21 NOTE — TELEPHONE ENCOUNTER
LMTRC, will relay to patient that provider recommends that she starts medication for MS, ordered an [facility] pharmacy appt to discuss options and have them order medication in collaboration with Dr. Méndez.     Nick RIOJAS RN, BSN  [facility]

## 2025-02-24 ENCOUNTER — MYC MEDICAL ADVICE (OUTPATIENT)
Dept: NEUROLOGY | Facility: CLINIC | Age: 40
End: 2025-02-24
Payer: COMMERCIAL

## 2025-02-24 NOTE — TELEPHONE ENCOUNTER
Sent ronal colindres to patient, she is scheduled with [facility] pharmacy on 2/28    Nick RIOJAS RN, BSN  [facility]

## 2025-02-24 NOTE — TELEPHONE ENCOUNTER
Ana María HASSAN that I placed the referral to you to discuss b cell therapies for MS     However, she is inquiring about other medications. Tecfidera/dimethyl fumarate would be reasonable as a secondary option   I would also be okay with copaxone, though I would caution that this is the weakest medication and is a decker as to whether or not it will work     Ronda Méndez MD on 2/24/2025 at 5:16 PM

## 2025-03-08 ENCOUNTER — HEALTH MAINTENANCE LETTER (OUTPATIENT)
Age: 40
End: 2025-03-08

## 2025-03-10 ENCOUNTER — TELEPHONE (OUTPATIENT)
Dept: PHARMACY | Facility: CLINIC | Age: 40
End: 2025-03-10
Payer: COMMERCIAL

## 2025-03-10 DIAGNOSIS — G35 MULTIPLE SCLEROSIS (H): Primary | ICD-10-CM

## 2025-03-11 ENCOUNTER — TELEPHONE (OUTPATIENT)
Dept: ONCOLOGY | Facility: HOSPITAL | Age: 40
End: 2025-03-11
Payer: COMMERCIAL

## 2025-03-11 RX ORDER — ALBUTEROL SULFATE 90 UG/1
1-2 INHALANT RESPIRATORY (INHALATION)
Start: 2025-03-18

## 2025-03-11 RX ORDER — DIPHENHYDRAMINE HYDROCHLORIDE 50 MG/ML
25 INJECTION, SOLUTION INTRAMUSCULAR; INTRAVENOUS
Start: 2025-03-18

## 2025-03-11 RX ORDER — HEPARIN SODIUM,PORCINE 10 UNIT/ML
5-20 VIAL (ML) INTRAVENOUS DAILY PRN
OUTPATIENT
Start: 2025-03-18

## 2025-03-11 RX ORDER — METHYLPREDNISOLONE SODIUM SUCCINATE 125 MG/2ML
125 INJECTION INTRAMUSCULAR; INTRAVENOUS ONCE
OUTPATIENT
Start: 2025-03-18

## 2025-03-11 RX ORDER — ALBUTEROL SULFATE 0.83 MG/ML
2.5 SOLUTION RESPIRATORY (INHALATION)
OUTPATIENT
Start: 2025-03-18

## 2025-03-11 RX ORDER — OCRELIZUMAB 300 MG/10ML
300 INJECTION INTRAVENOUS
Status: SHIPPED
Start: 2025-03-11

## 2025-03-11 RX ORDER — HEPARIN SODIUM (PORCINE) LOCK FLUSH IV SOLN 100 UNIT/ML 100 UNIT/ML
5 SOLUTION INTRAVENOUS
OUTPATIENT
Start: 2025-03-18

## 2025-03-11 RX ORDER — DIPHENHYDRAMINE HCL 50 MG
50 CAPSULE ORAL ONCE
OUTPATIENT
Start: 2025-03-18

## 2025-03-11 RX ORDER — EPINEPHRINE 1 MG/ML
0.3 INJECTION, SOLUTION, CONCENTRATE INTRAVENOUS EVERY 5 MIN PRN
OUTPATIENT
Start: 2025-03-18

## 2025-03-11 RX ORDER — METHYLPREDNISOLONE SODIUM SUCCINATE 40 MG/ML
40 INJECTION INTRAMUSCULAR; INTRAVENOUS
Start: 2025-03-18

## 2025-03-11 RX ORDER — DIPHENHYDRAMINE HYDROCHLORIDE 50 MG/ML
50 INJECTION, SOLUTION INTRAMUSCULAR; INTRAVENOUS
Start: 2025-03-18

## 2025-03-11 RX ORDER — ACETAMINOPHEN 325 MG/1
650 TABLET ORAL ONCE
OUTPATIENT
Start: 2025-03-18

## 2025-03-11 NOTE — TELEPHONE ENCOUNTER
[employer] msg sent to the pt in regards to completing the Ocrevus form.        VALENTIN Pettit on 3/11/2025 at 1:46 PM

## 2025-03-11 NOTE — TELEPHONE ENCOUNTER
Patient returned call to discuss going forward with Ocrevus infusions. Provided education to patient on dosing, possible side effects, and infusion process. Patient will complete vaccines recommended by primary care provider and then schedule her infusion for two weeks later. Reviewed copay card information and will send patient the website to sign up for this card. Provided patient with infusion number to schedule her infusion.     Patient to start Ocrevus with plans of infusing at [facility] or [facility] depending on insurance. Ambulatory care prescription signed and therapy plan ordered.     Ana María Izaguirre, PharmD, BCACP  Medication Therapy Management Pharmacist   [facility]

## 2025-04-22 ENCOUNTER — INFUSION THERAPY VISIT (OUTPATIENT)
Dept: INFUSION THERAPY | Facility: HOSPITAL | Age: 40
End: 2025-04-22
Attending: PSYCHIATRY & NEUROLOGY
Payer: COMMERCIAL

## 2025-04-22 VITALS
HEIGHT: 68 IN | WEIGHT: 140.9 LBS | BODY MASS INDEX: 21.35 KG/M2 | DIASTOLIC BLOOD PRESSURE: 65 MMHG | TEMPERATURE: 98.6 F | OXYGEN SATURATION: 96 % | HEART RATE: 60 BPM | RESPIRATION RATE: 18 BRPM | SYSTOLIC BLOOD PRESSURE: 101 MMHG

## 2025-04-22 DIAGNOSIS — G35 MULTIPLE SCLEROSIS (H): Primary | ICD-10-CM

## 2025-04-22 PROCEDURE — 96366 THER/PROPH/DIAG IV INF ADDON: CPT

## 2025-04-22 PROCEDURE — 258N000003 HC RX IP 258 OP 636: Performed by: PSYCHIATRY & NEUROLOGY

## 2025-04-22 PROCEDURE — 96375 TX/PRO/DX INJ NEW DRUG ADDON: CPT

## 2025-04-22 PROCEDURE — 96365 THER/PROPH/DIAG IV INF INIT: CPT

## 2025-04-22 PROCEDURE — 250N000011 HC RX IP 250 OP 636: Mod: JZ | Performed by: PSYCHIATRY & NEUROLOGY

## 2025-04-22 PROCEDURE — 250N000013 HC RX MED GY IP 250 OP 250 PS 637: Performed by: PSYCHIATRY & NEUROLOGY

## 2025-04-22 RX ORDER — DIPHENHYDRAMINE HYDROCHLORIDE 50 MG/ML
25 INJECTION, SOLUTION INTRAMUSCULAR; INTRAVENOUS
Start: 2025-05-06

## 2025-04-22 RX ORDER — DIPHENHYDRAMINE HCL 50 MG
50 CAPSULE ORAL ONCE
Status: COMPLETED | OUTPATIENT
Start: 2025-04-22 | End: 2025-04-22

## 2025-04-22 RX ORDER — ALBUTEROL SULFATE 90 UG/1
1-2 INHALANT RESPIRATORY (INHALATION)
Start: 2025-05-06

## 2025-04-22 RX ORDER — DIPHENHYDRAMINE HYDROCHLORIDE 50 MG/ML
50 INJECTION, SOLUTION INTRAMUSCULAR; INTRAVENOUS
Start: 2025-05-06

## 2025-04-22 RX ORDER — METHYLPREDNISOLONE SODIUM SUCCINATE 125 MG/2ML
125 INJECTION INTRAMUSCULAR; INTRAVENOUS ONCE
Status: COMPLETED | OUTPATIENT
Start: 2025-04-22 | End: 2025-04-22

## 2025-04-22 RX ORDER — ACETAMINOPHEN 325 MG/1
650 TABLET ORAL ONCE
Status: COMPLETED | OUTPATIENT
Start: 2025-04-22 | End: 2025-04-22

## 2025-04-22 RX ORDER — EPINEPHRINE 1 MG/ML
0.3 INJECTION, SOLUTION INTRAMUSCULAR; SUBCUTANEOUS EVERY 5 MIN PRN
OUTPATIENT
Start: 2025-05-06

## 2025-04-22 RX ORDER — ACETAMINOPHEN 325 MG/1
650 TABLET ORAL ONCE
OUTPATIENT
Start: 2025-05-06

## 2025-04-22 RX ORDER — HEPARIN SODIUM (PORCINE) LOCK FLUSH IV SOLN 100 UNIT/ML 100 UNIT/ML
5 SOLUTION INTRAVENOUS
OUTPATIENT
Start: 2025-05-06

## 2025-04-22 RX ORDER — METHYLPREDNISOLONE SODIUM SUCCINATE 40 MG/ML
40 INJECTION INTRAMUSCULAR; INTRAVENOUS
Start: 2025-05-06

## 2025-04-22 RX ORDER — METHYLPREDNISOLONE SODIUM SUCCINATE 125 MG/2ML
125 INJECTION INTRAMUSCULAR; INTRAVENOUS ONCE
OUTPATIENT
Start: 2025-05-06

## 2025-04-22 RX ORDER — ALBUTEROL SULFATE 0.83 MG/ML
2.5 SOLUTION RESPIRATORY (INHALATION)
OUTPATIENT
Start: 2025-05-06

## 2025-04-22 RX ORDER — HEPARIN SODIUM,PORCINE 10 UNIT/ML
5-20 VIAL (ML) INTRAVENOUS DAILY PRN
OUTPATIENT
Start: 2025-05-06

## 2025-04-22 RX ORDER — DIPHENHYDRAMINE HCL 50 MG
50 CAPSULE ORAL ONCE
OUTPATIENT
Start: 2025-05-06

## 2025-04-22 RX ADMIN — ACETAMINOPHEN 650 MG: 325 TABLET ORAL at 08:31

## 2025-04-22 RX ADMIN — SODIUM CHLORIDE 250 ML: 0.9 INJECTION, SOLUTION INTRAVENOUS at 08:32

## 2025-04-22 RX ADMIN — OCRELIZUMAB 300 MG: 300 INJECTION INTRAVENOUS at 08:59

## 2025-04-22 RX ADMIN — DIPHENHYDRAMINE HYDROCHLORIDE 50 MG: 50 CAPSULE ORAL at 08:32

## 2025-04-22 RX ADMIN — METHYLPREDNISOLONE SODIUM SUCCINATE 125 MG: 125 INJECTION, POWDER, FOR SOLUTION INTRAMUSCULAR; INTRAVENOUS at 08:32

## 2025-04-22 NOTE — PROGRESS NOTES
Infusion Nursing Note:  Kari Brown presents today for Ocrevus first dose.    Patient seen by provider today: No   present during visit today: Not Applicable.    Note: Kari came to the clinic ambulatory and reported feeling well. She came to the infusion center for her first dose of Ocrevus for MS. Reviewed the plan with the patient and she verbalized understanding, answered her questions. Started Orevus at a rate of 30 ml/hr, pt developed a tolerable headache at 60 ml/he and flu like whole body aches at a rate of 90 ml/hr. Pt reported that all symptoms were tolerable, stopped rate increment and infused the medication at a rate of 90 ml/hr. The patient reported relieve of symptoms by the end of the infusion and was able to tolerate the medication. Pt observed for 60 min post infusion.    Premeds Given: benadryl PO, solucortef, and tylenol    Intravenous Access:  Peripheral IV placed.    Treatment Conditions:  Biological Infusion Checklist:  ~~~ NOTE: If the patient answers yes to any of the questions below, hold the infusion and contact ordering provider or on-call provider.    Have you recently had an elevated temperature, fever, chills, productive cough, coughing for 3 weeks or longer or hemoptysis,  abnormal vital signs, night sweats,  chest pain or have you noticed a decrease in your appetite, unexplained weight loss or fatigue? No  Do you have any open wounds or new incisions? No  Do you have any upcoming hospitalizations or surgeries? Does not include esophagogastroduodenoscopy, colonoscopy, endoscopic retrograde cholangiopancreatography (ERCP), endoscopic ultrasound (EUS), dental procedures or joint aspiration/steroid injections No  Do you currently have any signs of illness or infection or are you on any antibiotics? No  Have you had any new, sudden or worsening abdominal pain? No  Have you or anyone in your household received a live vaccination in the past 4 weeks? Please note: No live  vaccines while on biologic/chemotherapy until 6 months after the last treatment. Patient can receive the flu vaccine (shot only), pneumovax and the Covid vaccine. It is optimal for the patient to get these vaccines mid cycle, but they can be given at any time as long as it is not on the day of the infusion. No  Have you recently been diagnosed with any new nervous system diseases (ie. Multiple sclerosis, Guillain [place], seizures, neurological changes) or cancer diagnosis? Are you on any form of radiation or chemotherapy? No  Are you pregnant or breast feeding or do you have plans of pregnancy in the future? No  Have you been having any signs of worsening depression or suicidal ideations?  (benlysta only) No  Have there been any other new onset medical symptoms? No  Have you had any new blood clots? (IVIG only) No      Post Infusion Assessment:  IV site clear, no redness or infiltration noted. Aiyana Marshall RN discontinued access.  Biologic Infusion Post Education: Call the triage nurse at your clinic or seek medical attention if you have chills and/or temperature greater than or equal to 100.5, uncontrolled nausea/vomiting, diarrhea, constipation, dizziness, shortness of breath, chest pain, heart palpitations, weakness or any other new or concerning symptoms, questions or concerns.  You cannot have any live virus vaccines prior to or during treatment or up to 6 months post infusion.  If you have an upcoming surgery, medical procedure or dental procedure during treatment, this should be discussed with your ordering physician and your surgeon/dentist.  If you are having any concerning symptom, if you are unsure if you should get your next infusion or wish to speak to a provider before your next infusion, please call your care coordinator or triage nurse at your clinic to notify them so we can adequately serve you.       Discharge Plan:   Discharge instructions reviewed with: Patient.  Patient and/or family  verbalized understanding of discharge instructions and all questions answered.  Patient discharged in stable condition accompanied by: self.  Departure Mode: Ambulatory.      Aiyana Marshall RN

## 2025-05-06 ENCOUNTER — INFUSION THERAPY VISIT (OUTPATIENT)
Dept: INFUSION THERAPY | Facility: HOSPITAL | Age: 40
End: 2025-05-06
Attending: PSYCHIATRY & NEUROLOGY
Payer: COMMERCIAL

## 2025-05-06 VITALS
OXYGEN SATURATION: 99 % | HEART RATE: 70 BPM | DIASTOLIC BLOOD PRESSURE: 78 MMHG | TEMPERATURE: 98.2 F | RESPIRATION RATE: 16 BRPM | SYSTOLIC BLOOD PRESSURE: 121 MMHG

## 2025-05-06 DIAGNOSIS — G35 MULTIPLE SCLEROSIS (H): Primary | ICD-10-CM

## 2025-05-06 PROCEDURE — 96375 TX/PRO/DX INJ NEW DRUG ADDON: CPT

## 2025-05-06 PROCEDURE — 250N000013 HC RX MED GY IP 250 OP 250 PS 637: Performed by: PSYCHIATRY & NEUROLOGY

## 2025-05-06 PROCEDURE — 250N000011 HC RX IP 250 OP 636: Mod: JZ | Performed by: PSYCHIATRY & NEUROLOGY

## 2025-05-06 PROCEDURE — 96365 THER/PROPH/DIAG IV INF INIT: CPT

## 2025-05-06 PROCEDURE — 96366 THER/PROPH/DIAG IV INF ADDON: CPT

## 2025-05-06 PROCEDURE — 258N000003 HC RX IP 258 OP 636: Performed by: PSYCHIATRY & NEUROLOGY

## 2025-05-06 RX ORDER — DIPHENHYDRAMINE HYDROCHLORIDE 50 MG/ML
25 INJECTION, SOLUTION INTRAMUSCULAR; INTRAVENOUS
Start: 2025-05-06

## 2025-05-06 RX ORDER — HEPARIN SODIUM,PORCINE 10 UNIT/ML
5-20 VIAL (ML) INTRAVENOUS DAILY PRN
OUTPATIENT
Start: 2025-05-06

## 2025-05-06 RX ORDER — METHYLPREDNISOLONE SODIUM SUCCINATE 40 MG/ML
40 INJECTION INTRAMUSCULAR; INTRAVENOUS
Status: DISCONTINUED | OUTPATIENT
Start: 2025-05-06 | End: 2025-05-06 | Stop reason: HOSPADM

## 2025-05-06 RX ORDER — HEPARIN SODIUM (PORCINE) LOCK FLUSH IV SOLN 100 UNIT/ML 100 UNIT/ML
5 SOLUTION INTRAVENOUS
OUTPATIENT
Start: 2025-05-06

## 2025-05-06 RX ORDER — METHYLPREDNISOLONE SODIUM SUCCINATE 125 MG/2ML
125 INJECTION INTRAMUSCULAR; INTRAVENOUS ONCE
Status: CANCELLED | OUTPATIENT
Start: 2025-05-06

## 2025-05-06 RX ORDER — DIPHENHYDRAMINE HYDROCHLORIDE 50 MG/ML
25 INJECTION, SOLUTION INTRAMUSCULAR; INTRAVENOUS
Status: DISCONTINUED | OUTPATIENT
Start: 2025-05-06 | End: 2025-05-06 | Stop reason: HOSPADM

## 2025-05-06 RX ORDER — ALBUTEROL SULFATE 90 UG/1
1-2 INHALANT RESPIRATORY (INHALATION)
Start: 2025-05-06

## 2025-05-06 RX ORDER — HEPARIN SODIUM (PORCINE) LOCK FLUSH IV SOLN 100 UNIT/ML 100 UNIT/ML
5 SOLUTION INTRAVENOUS
Status: DISCONTINUED | OUTPATIENT
Start: 2025-05-06 | End: 2025-05-06 | Stop reason: HOSPADM

## 2025-05-06 RX ORDER — ACETAMINOPHEN 325 MG/1
650 TABLET ORAL ONCE
Status: CANCELLED | OUTPATIENT
Start: 2025-05-06

## 2025-05-06 RX ORDER — DIPHENHYDRAMINE HYDROCHLORIDE 50 MG/ML
50 INJECTION, SOLUTION INTRAMUSCULAR; INTRAVENOUS
Start: 2025-05-06

## 2025-05-06 RX ORDER — METHYLPREDNISOLONE SODIUM SUCCINATE 125 MG/2ML
125 INJECTION INTRAMUSCULAR; INTRAVENOUS ONCE
Status: COMPLETED | OUTPATIENT
Start: 2025-05-06 | End: 2025-05-06

## 2025-05-06 RX ORDER — EPINEPHRINE 1 MG/ML
0.3 INJECTION, SOLUTION INTRAMUSCULAR; SUBCUTANEOUS EVERY 5 MIN PRN
OUTPATIENT
Start: 2025-05-06

## 2025-05-06 RX ORDER — ALBUTEROL SULFATE 90 UG/1
1-2 INHALANT RESPIRATORY (INHALATION)
Status: DISCONTINUED | OUTPATIENT
Start: 2025-05-06 | End: 2025-05-06 | Stop reason: HOSPADM

## 2025-05-06 RX ORDER — ACETAMINOPHEN 325 MG/1
650 TABLET ORAL ONCE
Status: COMPLETED | OUTPATIENT
Start: 2025-05-06 | End: 2025-05-06

## 2025-05-06 RX ORDER — DIPHENHYDRAMINE HCL 50 MG
50 CAPSULE ORAL ONCE
Status: CANCELLED | OUTPATIENT
Start: 2025-05-06

## 2025-05-06 RX ORDER — ALBUTEROL SULFATE 0.83 MG/ML
2.5 SOLUTION RESPIRATORY (INHALATION)
Status: DISCONTINUED | OUTPATIENT
Start: 2025-05-06 | End: 2025-05-06 | Stop reason: HOSPADM

## 2025-05-06 RX ORDER — EPINEPHRINE 1 MG/ML
0.3 INJECTION, SOLUTION INTRAMUSCULAR; SUBCUTANEOUS EVERY 5 MIN PRN
Status: DISCONTINUED | OUTPATIENT
Start: 2025-05-06 | End: 2025-05-06 | Stop reason: HOSPADM

## 2025-05-06 RX ORDER — DIPHENHYDRAMINE HYDROCHLORIDE 50 MG/ML
50 INJECTION, SOLUTION INTRAMUSCULAR; INTRAVENOUS
Status: DISCONTINUED | OUTPATIENT
Start: 2025-05-06 | End: 2025-05-06 | Stop reason: HOSPADM

## 2025-05-06 RX ORDER — ALBUTEROL SULFATE 0.83 MG/ML
2.5 SOLUTION RESPIRATORY (INHALATION)
OUTPATIENT
Start: 2025-05-06

## 2025-05-06 RX ORDER — HEPARIN SODIUM,PORCINE 10 UNIT/ML
5-20 VIAL (ML) INTRAVENOUS DAILY PRN
Status: DISCONTINUED | OUTPATIENT
Start: 2025-05-06 | End: 2025-05-06 | Stop reason: HOSPADM

## 2025-05-06 RX ORDER — METHYLPREDNISOLONE SODIUM SUCCINATE 40 MG/ML
40 INJECTION INTRAMUSCULAR; INTRAVENOUS
Start: 2025-05-06

## 2025-05-06 RX ORDER — DIPHENHYDRAMINE HCL 50 MG
50 CAPSULE ORAL ONCE
Status: COMPLETED | OUTPATIENT
Start: 2025-05-06 | End: 2025-05-06

## 2025-05-06 RX ADMIN — ACETAMINOPHEN 650 MG: 325 TABLET ORAL at 08:47

## 2025-05-06 RX ADMIN — SODIUM CHLORIDE 250 ML: 0.9 INJECTION, SOLUTION INTRAVENOUS at 08:47

## 2025-05-06 RX ADMIN — DIPHENHYDRAMINE HYDROCHLORIDE 50 MG: 50 CAPSULE ORAL at 08:48

## 2025-05-06 RX ADMIN — OCRELIZUMAB 300 MG: 300 INJECTION INTRAVENOUS at 09:20

## 2025-05-06 RX ADMIN — METHYLPREDNISOLONE SODIUM SUCCINATE 125 MG: 125 INJECTION, POWDER, FOR SOLUTION INTRAMUSCULAR; INTRAVENOUS at 08:48

## 2025-05-06 NOTE — PROGRESS NOTES
~~~ NOTE: If the patient answers yes to any of the questions below, hold the infusion and contact ordering provider or on-call provider.    Do you currently have any signs of illness or infection or are you on any antibiotics? No  Have you recently had an elevated temperature, fever, chills, productive cough, coughing for 3 weeks or longer or hemoptysis, abnormal vital signs, night sweats, chest pain or have you noticed a decrease in your appetite, unexplained weight loss or fatigue? No  Have you had any new, sudden, or worsening abdominal pain? No  Do you have any open wounds or new incisions? (exclude for patients with hidradenitis suppurativa) No  Have you recently been diagnosed with any new nervous system diseases (ie. Multiple sclerosis, Guillain [place], seizures, neurological changes) or cancer diagnosis? Are you on any form of radiation or chemotherapy? No  Have there been any other new onset medical symptoms? No  Are you pregnant or breast feeding or do you have plans of pregnancy in the future? No; N/A  Do you have any upcoming hospitalizations or surgeries? Does not include esophagogastroduodenoscopy, colonoscopy, endoscopic retrograde cholangiopancreatography (ERCP), endoscopic ultrasound (EUS), dental procedures (including cleanings, fillings, implants, extractions)  or joint aspiration/steroid injections No  Have you or anyone in your household received a live vaccination in the past 4 weeks? Please note: No live vaccines while on biologic/chemotherapy until 6 months after the last treatment. Patient can receive the flu vaccine (shot only).  It is optimal for the patient to get it mid cycle, but it can be given at any time as long as it is not on the day of the infusion. No  If applicable to prescribed medication, confirm negative PPD or quantiferon gold MTB. If positive, verify has negative chest x-ray or the patient is at least 4 weeks post initiation of INH/B6 therapy and have clearance from provider  before infusion (Y/N:[id number])  If applicable to prescribed medication, confirm negative hepatitis B surface antigen or hepatitis C. If positive, clearance from provider before infusion. (Y/N: [id number])  Rheumatology patients receiving tocilizumab (Actemra): If labs were drawn within the past week, hold dosing until cleared to infuse If AST/ALT > 2 X upper limit normal; ANC < 1.0. NO; N/A  Patients receiving belimumab (Benlysta): Have you been having any signs of worsening depression or suicidal ideations? No; N/A

## 2025-05-06 NOTE — PROGRESS NOTES
Infusion Nursing Note:  Kari Brown presents today for IV ocrevus.    Patient seen by provider today: No   present during visit today: Not Applicable.    Note: Tolerated infusion well today, she did complain of a slight itchyness but refused any further medications.  She stated it would pass and she did not need further interventions.  Symptoms of itching lasted about 30 minutes then subsided.  Her rate of infusion reached 150 ml/hr. When she finished.  She was observed for one hour post infusion with no further complaints.  Discharge to home ambulating per self..    Premeds Given: benadryl PO, solumedrol, and tylenol    Intravenous Access:  Peripheral IV placed.    Treatment Conditions:  Not Applicable.      Post Infusion Assessment:  Patient tolerated infusion without incident.  Blood return noted pre and post infusion.  No evidence of extravasations.  Access discontinued per protocol.       Discharge Plan:   Patient and/or family verbalized understanding of discharge instructions and all questions answered.      Pamela Finch RN

## 2025-06-23 ENCOUNTER — OFFICE VISIT (OUTPATIENT)
Dept: NEUROLOGY | Facility: CLINIC | Age: 40
End: 2025-06-23
Payer: COMMERCIAL

## 2025-06-23 ENCOUNTER — LAB (OUTPATIENT)
Dept: LAB | Facility: CLINIC | Age: 40
End: 2025-06-23
Payer: COMMERCIAL

## 2025-06-23 VITALS — SYSTOLIC BLOOD PRESSURE: 115 MMHG | DIASTOLIC BLOOD PRESSURE: 73 MMHG | HEART RATE: 55 BPM

## 2025-06-23 DIAGNOSIS — R76.8 HIGH TOTAL SERUM IGM: ICD-10-CM

## 2025-06-23 DIAGNOSIS — G35 MULTIPLE SCLEROSIS (H): Primary | ICD-10-CM

## 2025-06-23 DIAGNOSIS — G35 MULTIPLE SCLEROSIS (H): ICD-10-CM

## 2025-06-23 LAB
BASOPHILS # BLD AUTO: 0.1 10E3/UL (ref 0–0.2)
BASOPHILS NFR BLD AUTO: 1 %
CD19 B CELL COMMENT: ABNORMAL
CD19 CELLS # BLD: <1 CELLS/UL (ref 107–698)
CD19 CELLS NFR BLD: <1 % (ref 6–27)
EOSINOPHIL # BLD AUTO: 0.1 10E3/UL (ref 0–0.7)
EOSINOPHIL NFR BLD AUTO: 3 %
ERYTHROCYTE [DISTWIDTH] IN BLOOD BY AUTOMATED COUNT: 14.1 % (ref 10–15)
HCT VFR BLD AUTO: 41.5 % (ref 35–47)
HGB BLD-MCNC: 13.9 G/DL (ref 11.7–15.7)
IMM GRANULOCYTES # BLD: 0.1 10E3/UL
IMM GRANULOCYTES NFR BLD: 1 %
LYMPHOCYTES # BLD AUTO: 1.3 10E3/UL (ref 0.8–5.3)
LYMPHOCYTES NFR BLD AUTO: 25 %
MCH RBC QN AUTO: 29.4 PG (ref 26.5–33)
MCHC RBC AUTO-ENTMCNC: 33.5 G/DL (ref 31.5–36.5)
MCV RBC AUTO: 88 FL (ref 78–100)
MONOCYTES # BLD AUTO: 0.7 10E3/UL (ref 0–1.3)
MONOCYTES NFR BLD AUTO: 13 %
NEUTROPHILS # BLD AUTO: 3 10E3/UL (ref 1.6–8.3)
NEUTROPHILS NFR BLD AUTO: 57 %
NRBC # BLD AUTO: 0 10E3/UL
NRBC BLD AUTO-RTO: 0 /100
PLATELET # BLD AUTO: 206 10E3/UL (ref 150–450)
RBC # BLD AUTO: 4.73 10E6/UL (ref 3.8–5.2)
WBC # BLD AUTO: 5.2 10E3/UL (ref 4–11)

## 2025-06-23 PROCEDURE — 3074F SYST BP LT 130 MM HG: CPT | Performed by: PSYCHIATRY & NEUROLOGY

## 2025-06-23 PROCEDURE — 82784 ASSAY IGA/IGD/IGG/IGM EACH: CPT

## 2025-06-23 PROCEDURE — 85004 AUTOMATED DIFF WBC COUNT: CPT

## 2025-06-23 PROCEDURE — 36415 COLL VENOUS BLD VENIPUNCTURE: CPT

## 2025-06-23 PROCEDURE — 86355 B CELLS TOTAL COUNT: CPT

## 2025-06-23 PROCEDURE — 99214 OFFICE O/P EST MOD 30 MIN: CPT | Performed by: PSYCHIATRY & NEUROLOGY

## 2025-06-23 PROCEDURE — G2211 COMPLEX E/M VISIT ADD ON: HCPCS | Performed by: PSYCHIATRY & NEUROLOGY

## 2025-06-23 PROCEDURE — 3078F DIAST BP <80 MM HG: CPT | Performed by: PSYCHIATRY & NEUROLOGY

## 2025-06-23 NOTE — LETTER
6/23/2025      Kari Brown  [address] [facility] [address] [facility] [place] [address]      Dear Colleague,    Thank you for referring your patient, Kari Brown, to the [facility] NEUROLOGY [facility]. Please see a copy of my visit note below.    Date of Service: 6/23/2025    [facility]rology   MS Clinic Evaluation    Subjective: 40-year-old woman with a history of celiac disease, strabismus, anxiety/depression, who presents in follow-up for multiple sclerosis.    Recall that she has had 2 events of optic neuritis and MRI did reveal a couple small lesions in the upper cervical spine.  She also experienced some mild sensory myelitis symptoms.    She is now appropriately status post ocrelizumab.  She did experience a headache and flulike symptoms with the first dose, though this was only present during the infusion.  With the second dose she had slight itching.  Subsequent to the infusion she had some burning type pain in the extremities that recovered over the course of 1 week.  She noticed exercise intolerance during this time.  She overall felt that the treatment was manageable.    She does not report any discrete new symptoms related to multiple sclerosis.  Balance is okay, though she notes that she had an incident where she fell into a wall.  She was at work, got up really fast and started walking quickly but lost her balance and fell into the wall.    Bladder and bowel control is adequate.    Vision is at baseline.  Part of the view from the right eye is missing/pixelated.    She denies any major illnesses.  She had 1 cold that was manageable.    She had numbness in the right thigh at the time of her last appointment, though this is resolved.    She shares that her father has CVID.    Disease onset: ON 2023  Last relapse: 12/2024 recurrent R ON     DMD hx:   Ocrevus 4/22/25-present, LD 5/6/25    Allergies   Allergen Reactions     Aspirin Anaphylaxis, Angioedema, Other (See Comments) and Rash     Advised  to not take aspirin     Ibuprofen Anaphylaxis and Swelling     Face and throat swelling     Face and throat swelling     Nsaids Anaphylaxis and Angioedema     Wasp Venom Swelling and Difficulty breathing     Gluten Meal GI Disturbance, Nausea and Vomiting and Other (See Comments)     Celiac disease     Has Celiac disease   Celiac disease       Current Outpatient Medications   Medication Sig Dispense Refill     albuterol (PROAIR HFA/PROVENTIL HFA/VENTOLIN HFA) 108 (90 Base) MCG/ACT inhaler Inhale 1-2 puffs into the lungs every 6 hours as needed.       escitalopram (LEXAPRO) 5 MG tablet Take 5 mg by mouth daily       levonorgestrel (MIRENA) 52 MG (20 mcg/day) IUD 1 each by Intrauterine route once.       ocrelizumab (OCREVUS) 300 MG/10ML injection Inject 10 mLs (300 mg) into the vein every 14 days. (Patient taking differently: Inject 300 mg into the vein every 6 months.)       No current facility-administered medications for this visit.        Past medical, surgical, social and family history was personally reviewed. Pertinent details noted above.     Physical Examination:   /73 (BP Location: Right arm, Patient Position: Sitting, Cuff Size: Adult Regular)   Pulse 55     General: no acute distress  Cranial nerves:   VFFC  PER  EOM full w/no CHARLIE   Face symmetric  Hearing intact  No dysarthria   Motor:   Tone is normal   Bulk is normal     R L  Deltoid  5 5  Biceps  5 5  Triceps 5 5  Wrist ext 5 5  Finger ext 5 5  Finger abd 5 5    Hip flexion 5- 5-  Knee flexion 5 5  Knee ext 5 5  Ankle d/f 5 5    Reflexes: brisk in LE, babinski absent bilaterally  Sensory: vibration is minimally reduced in the toes, JPS mildly reduced in the right toe  Romberg is absent  Coordination: no ataxia or dysmetria  Gait: normal base and stride, tandem gait is intact, able to balance on one foot and hop x 5 bilaterally, able to get up from chair with single leg but a bit more difficult with left leg     Tests/Imaging:   CSF 0  ocb    Vitamin D 58  JCV Ab 0.75  Mog aqp4 neg    IgG 544  Alc 1700    MRI Brain  11/2023 - R ON gd+, left frontal t2 hyperintensity adjascent to cortex but not ovoid, small non specific distal wm lesion in left frontal lobe  1/2025-no new lesions, R ON faintly gd+    MRI Cervical spine   2023 - no lesions   2/2025-couple small lesions in upper c spine     MRI Thoracic spine   2023 - no lesions per personal review  2/2025-no lesions     Assessment: 40-year-old woman with relapsing remitting multiple sclerosis based on clinical history of 2 events of optic neuritis and evidence of myelitis.    She is now status post ocrelizumab.  She is tolerated treatment without major side effects.  Blood work is advised today to assess for toxicity.    She is due for radiologic surveillance in 6 months, this is to assess treatment response.    She is noted to have an elevated IgM and slightly low IgG.  We will follow-up on these today.  We discussed indications for IVIG.    Plan:   - Continue Ocrevus 600 mg IV every 6 months, blood work on day of infusion: CBC with differential, CD19, IgG  - Blood work today  - MRI in 6 months  - Follow-up after MRI    Note was completed with the assistance of Dragon Fluency software which can often result in accidental word substitutions.   The longitudinal plan of care for the diagnosis(es)/condition(s) as documented were addressed during this visit. Due to the added complexity in care, I will continue to support Kari in the subsequent management and with ongoing continuity of care.    A total of 30 minutes on the date of service were spent in the care of this patient.   Ronda Méndez MD on 6/23/2025 at 9:17 AM            Again, thank you for allowing me to participate in the care of your patient.        Sincerely,        Ronda Méndez MD    Electronically signed

## 2025-06-23 NOTE — PATIENT INSTRUCTIONS
Blood work today     Continue ocrevus - due in early November   Blood work again on the day of your infusion     Mri in 6 months     Follow up after MRI

## 2025-06-23 NOTE — PROGRESS NOTES
Date of Service: 6/23/2025    [facility] Neurology   MS Clinic Evaluation    Subjective: 40-year-old woman with a history of celiac disease, strabismus, anxiety/depression, who presents in follow-up for multiple sclerosis.    Recall that she has had 2 events of optic neuritis and MRI did reveal a couple small lesions in the upper cervical spine.  She also experienced some mild sensory myelitis symptoms.    She is now appropriately status post ocrelizumab.  She did experience a headache and flulike symptoms with the first dose, though this was only present during the infusion.  With the second dose she had slight itching.  Subsequent to the infusion she had some burning type pain in the extremities that recovered over the course of 1 week.  She noticed exercise intolerance during this time.  She overall felt that the treatment was manageable.    She does not report any discrete new symptoms related to multiple sclerosis.  Balance is okay, though she notes that she had an incident where she fell into a wall.  She was at work, got up really fast and started walking quickly but lost her balance and fell into the wall.    Bladder and bowel control is adequate.    Vision is at baseline.  Part of the view from the right eye is missing/pixelated.    She denies any major illnesses.  She had 1 cold that was manageable.    She had numbness in the right thigh at the time of her last appointment, though this is resolved.    She shares that her father has CVID.    Disease onset: ON 2023  Last relapse: 12/2024 recurrent R ON     DMD hx:   Ocrevus 4/22/25-present, LD 5/6/25    Allergies   Allergen Reactions    Aspirin Anaphylaxis, Angioedema, Other (See Comments) and Rash     Advised to not take aspirin    Ibuprofen Anaphylaxis and Swelling     Face and throat swelling     Face and throat swelling    Nsaids Anaphylaxis and Angioedema    Wasp Venom Swelling and Difficulty breathing    Gluten Meal GI Disturbance, Nausea and Vomiting  and Other (See Comments)     Celiac disease     Has Celiac disease   Celiac disease       Current Outpatient Medications   Medication Sig Dispense Refill    albuterol (PROAIR HFA/PROVENTIL HFA/VENTOLIN HFA) 108 (90 Base) MCG/ACT inhaler Inhale 1-2 puffs into the lungs every 6 hours as needed.      escitalopram (LEXAPRO) 5 MG tablet Take 5 mg by mouth daily      levonorgestrel (MIRENA) 52 MG (20 mcg/day) IUD 1 each by Intrauterine route once.      ocrelizumab (OCREVUS) 300 MG/10ML injection Inject 10 mLs (300 mg) into the vein every 14 days. (Patient taking differently: Inject 300 mg into the vein every 6 months.)       No current facility-administered medications for this visit.        Past medical, surgical, social and family history was personally reviewed. Pertinent details noted above.     Physical Examination:   /73 (BP Location: Right arm, Patient Position: Sitting, Cuff Size: Adult Regular)   Pulse 55     General: no acute distress  Cranial nerves:   VFFC  PER  EOM full w/no CHARLIE   Face symmetric  Hearing intact  No dysarthria   Motor:   Tone is normal   Bulk is normal     R L  Deltoid  5 5  Biceps  5 5  Triceps 5 5  Wrist ext 5 5  Finger ext 5 5  Finger abd 5 5    Hip flexion 5- 5-  Knee flexion 5 5  Knee ext 5 5  Ankle d/f 5 5    Reflexes: brisk in LE, babinski absent bilaterally  Sensory: vibration is minimally reduced in the toes, JPS mildly reduced in the right toe  Romberg is absent  Coordination: no ataxia or dysmetria  Gait: normal base and stride, tandem gait is intact, able to balance on one foot and hop x 5 bilaterally, able to get up from chair with single leg but a bit more difficult with left leg     Tests/Imaging:   CSF 0 ocb    Vitamin D 58  JCV Ab 0.75  Mog aqp4 neg    IgG 544  Alc 1700    MRI Brain  11/2023 - R ON gd+, left frontal t2 hyperintensity adjascent to cortex but not ovoid, small non specific distal wm lesion in left frontal lobe  1/2025-no new lesions, R ON faintly  gd+    MRI Cervical spine   2023 - no lesions   2/2025-couple small lesions in upper c spine     MRI Thoracic spine   2023 - no lesions per personal review  2/2025-no lesions     Assessment: 40-year-old woman with relapsing remitting multiple sclerosis based on clinical history of 2 events of optic neuritis and evidence of myelitis.    She is now status post ocrelizumab.  She is tolerated treatment without major side effects.  Blood work is advised today to assess for toxicity.    She is due for radiologic surveillance in 6 months, this is to assess treatment response.    She is noted to have an elevated IgM and slightly low IgG.  We will follow-up on these today.  We discussed indications for IVIG.    Plan:   - Continue Ocrevus 600 mg IV every 6 months, blood work on day of infusion: CBC with differential, CD19, IgG  - Blood work today  - MRI in 6 months  - Follow-up after MRI    Note was completed with the assistance of Dragon Fluency software which can often result in accidental word substitutions.   The longitudinal plan of care for the diagnosis(es)/condition(s) as documented were addressed during this visit. Due to the added complexity in care, I will continue to support Kari in the subsequent management and with ongoing continuity of care.    A total of 30 minutes on the date of service were spent in the care of this patient.   Ronda Méndez MD on 6/23/2025 at 9:17 AM

## 2025-06-23 NOTE — NURSING NOTE
Chief Complaint   Patient presents with    Follow Up       VALENTIN Pettit on 6/23/2025 at 9:13 AM

## 2025-06-24 LAB
IGG SERPL-MCNC: 618 MG/DL (ref 610–1616)
IGM SERPL-MCNC: 215 MG/DL (ref 35–242)

## 2025-07-01 ENCOUNTER — RESULTS FOLLOW-UP (OUTPATIENT)
Dept: NEUROLOGY | Facility: CLINIC | Age: 40
End: 2025-07-01

## 2025-08-05 ENCOUNTER — TRANSCRIBE ORDERS (OUTPATIENT)
Dept: OTHER | Age: 40
End: 2025-08-05

## 2025-08-05 DIAGNOSIS — R51.9 OCCIPITAL HEADACHE: Primary | ICD-10-CM

## 2025-08-05 DIAGNOSIS — M79.606 PAIN OF LOWER EXTREMITY, UNSPECIFIED LATERALITY: ICD-10-CM

## 2025-08-09 ASSESSMENT — ACTIVITIES OF DAILY LIVING (ADL)
KNEEL ON THE FRONT OF YOUR KNEE: ACTIVITY IS SOMEWHAT DIFFICULT
WALK: ACTIVITY IS NOT DIFFICULT
RISE FROM A CHAIR: ACTIVITY IS NOT DIFFICULT
GO DOWN STAIRS: ACTIVITY IS NOT DIFFICULT
GIVING WAY, BUCKLING OR SHIFTING OF KNEE: I DO NOT HAVE THE SYMPTOM
WEAKNESS: I DO NOT HAVE THE SYMPTOM
HOW_WOULD_YOU_RATE_THE_OVERALL_FUNCTION_OF_YOUR_KNEE_DURING_YOUR_USUAL_DAILY_ACTIVITIES?: ABNORMAL
GO DOWN STAIRS: ACTIVITY IS NOT DIFFICULT
SQUAT: ACTIVITY IS NOT DIFFICULT
GO UP STAIRS: ACTIVITY IS NOT DIFFICULT
HOW_WOULD_YOU_RATE_THE_OVERALL_FUNCTION_OF_YOUR_KNEE_DURING_YOUR_USUAL_DAILY_ACTIVITIES?: ABNORMAL
HOW_WOULD_YOU_RATE_THE_CURRENT_FUNCTION_OF_YOUR_KNEE_DURING_YOUR_USUAL_DAILY_ACTIVITIES_ON_A_SCALE_FROM_0_TO_100_WITH_100_BEING_YOUR_LEVEL_OF_KNEE_FUNCTION_PRIOR_TO_YOUR_INJURY_AND_0_BEING_THE_INABILITY_TO_PERFORM_ANY_OF_YOUR_USUAL_DAILY_ACTIVITIES?: 80
WALK: ACTIVITY IS NOT DIFFICULT
RAW_SCORE: 64
PAIN: THE SYMPTOM AFFECTS MY ACTIVITY MODERATELY
AS_A_RESULT_OF_YOUR_KNEE_INJURY,_HOW_WOULD_YOU_RATE_YOUR_CURRENT_LEVEL_OF_DAILY_ACTIVITY?: ABNORMAL
HOW_WOULD_YOU_RATE_THE_CURRENT_FUNCTION_OF_YOUR_KNEE_DURING_YOUR_USUAL_DAILY_ACTIVITIES_ON_A_SCALE_FROM_0_TO_100_WITH_100_BEING_YOUR_LEVEL_OF_KNEE_FUNCTION_PRIOR_TO_YOUR_INJURY_AND_0_BEING_THE_INABILITY_TO_PERFORM_ANY_OF_YOUR_USUAL_DAILY_ACTIVITIES?: 80
LIMPING: I DO NOT HAVE THE SYMPTOM
SQUAT: ACTIVITY IS NOT DIFFICULT
WEAKNESS: I DO NOT HAVE THE SYMPTOM
SWELLING: I DO NOT HAVE THE SYMPTOM
KNEE_ACTIVITY_OF_DAILY_LIVING_SCORE: 91.43
SWELLING: I DO NOT HAVE THE SYMPTOM
STIFFNESS: I DO NOT HAVE THE SYMPTOM
AS_A_RESULT_OF_YOUR_KNEE_INJURY,_HOW_WOULD_YOU_RATE_YOUR_CURRENT_LEVEL_OF_DAILY_ACTIVITY?: ABNORMAL
LIMPING: I DO NOT HAVE THE SYMPTOM
GO UP STAIRS: ACTIVITY IS NOT DIFFICULT
STIFFNESS: I DO NOT HAVE THE SYMPTOM
PLEASE_INDICATE_YOR_PRIMARY_REASON_FOR_REFERRAL_TO_THERAPY:: KNEE
KNEE_ACTIVITY_OF_DAILY_LIVING_SUM: 64
PAIN: THE SYMPTOM AFFECTS MY ACTIVITY MODERATELY
STAND: ACTIVITY IS MINIMALLY DIFFICULT
SIT WITH YOUR KNEE BENT: ACTIVITY IS NOT DIFFICULT
RISE FROM A CHAIR: ACTIVITY IS NOT DIFFICULT
KNEEL ON THE FRONT OF YOUR KNEE: ACTIVITY IS SOMEWHAT DIFFICULT
STAND: ACTIVITY IS MINIMALLY DIFFICULT
SIT WITH YOUR KNEE BENT: ACTIVITY IS NOT DIFFICULT
GIVING WAY, BUCKLING OR SHIFTING OF KNEE: I DO NOT HAVE THE SYMPTOM

## 2025-08-11 ENCOUNTER — THERAPY VISIT (OUTPATIENT)
Dept: PHYSICAL THERAPY | Facility: CLINIC | Age: 40
End: 2025-08-11
Payer: COMMERCIAL

## 2025-08-11 DIAGNOSIS — M79.606 PAIN OF LOWER EXTREMITY, UNSPECIFIED LATERALITY: Primary | ICD-10-CM

## 2025-08-11 PROCEDURE — 97140 MANUAL THERAPY 1/> REGIONS: CPT | Mod: GP | Performed by: PHYSICAL THERAPIST

## 2025-08-11 PROCEDURE — 97112 NEUROMUSCULAR REEDUCATION: CPT | Mod: GP | Performed by: PHYSICAL THERAPIST

## 2025-08-11 PROCEDURE — 97161 PT EVAL LOW COMPLEX 20 MIN: CPT | Mod: GP | Performed by: PHYSICAL THERAPIST

## 2025-08-18 ENCOUNTER — ENROLLMENT (OUTPATIENT)
Dept: HOME HEALTH SERVICES | Facility: HOME HEALTH | Age: 40
End: 2025-08-18
Payer: COMMERCIAL

## 2025-08-18 ENCOUNTER — THERAPY VISIT (OUTPATIENT)
Dept: PHYSICAL THERAPY | Facility: CLINIC | Age: 40
End: 2025-08-18
Payer: COMMERCIAL

## 2025-08-18 DIAGNOSIS — M79.606 PAIN OF LOWER EXTREMITY, UNSPECIFIED LATERALITY: Primary | ICD-10-CM

## 2025-08-18 DIAGNOSIS — G35 MULTIPLE SCLEROSIS (H): Primary | ICD-10-CM

## 2025-08-18 PROCEDURE — 97112 NEUROMUSCULAR REEDUCATION: CPT | Mod: GP | Performed by: PHYSICAL THERAPIST

## 2025-08-18 PROCEDURE — 97140 MANUAL THERAPY 1/> REGIONS: CPT | Mod: GP | Performed by: PHYSICAL THERAPIST

## 2025-09-04 ENCOUNTER — THERAPY VISIT (OUTPATIENT)
Dept: PHYSICAL THERAPY | Facility: CLINIC | Age: 40
End: 2025-09-04
Attending: FAMILY MEDICINE
Payer: COMMERCIAL

## 2025-09-04 DIAGNOSIS — M79.606 PAIN OF LOWER EXTREMITY, UNSPECIFIED LATERALITY: Primary | ICD-10-CM
